# Patient Record
(demographics unavailable — no encounter records)

---

## 2025-01-30 NOTE — ASSESSMENT
[FreeTextEntry1] : 43yo F with T2DM seen for CPE  CPE today Labs today  just had the flu - will hold off sonogram done - benign lesion in L breast - repeat in May 2025 due for ophtho sees dental - Dr. Hampton 0([place]) has derm - due for visit sees GYN -Dr. Guerra  T2DM - moderately controlled-    cont glipizide, stillatro

## 2025-01-30 NOTE — HISTORY OF PRESENT ILLNESS
[FreeTextEntry1] : CPE [de-identified] : 342yo F  R. shoulder pain - onset while wearing a back back 1.5yrs ongoing -- notices the pain when she has to do her cleaning the pain goes into her chest area; pains lasts for seconds had a shoulder x-ray done but MRI was not approved...  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 Moreno of ENT; getitng dental implants next month

## 2025-01-30 NOTE — PHYSICAL EXAM
[de-identified] : Gen: Adult F, NAD Head: NC/AT EENT: ears grossly normal, PERRL, EOMI, moist mucosa Neck: supple Chest wall: nontender CV: normal s1 +s2, rrr, no murmurs Pulm: CTA-B Abd: soft, NT, ND Skin: no rashes Back: no CVA tenderness, no spinal tenderness Neuro: gait normal, AAOx3 Psych: normal affect, normal interaction

## 2025-02-06 NOTE — PHYSICAL EXAM
[de-identified] : Right shoulder exam  Inspection: No malalignment, No defects, No atrophy Skin: No masses, No lesions Neck: Negative Spurling's, full ROM, no pain with ROM AROM: FF to 180, abduction to 80, ER to 60, IR to low lumbar Painful arc ROM: IR Tenderness: +bicipital tenderness, no tenderness to the greater tuberosity/RTC insertion, no anterior shoulder/lesser tuberosity tenderness Strength: 5/5 ER, 5/5 IR in adduction with pain, 5/5 supraspinatus testing with pain, negative Musselshell's test AC Joint: No ttp/pain with cross arm testing Biceps: Speed Negative, Yergusons Negative Impingement test: +Do, + Neer  Stability: Stable Vasc: 2+ radial pulse Neuro: AIN, PIN, Ulnar nerve intact to motor Sensation: Intact to light touch throughout  [de-identified] : 3 views of the right shoulder were obtained, 06/11/2024, that show no acute fracture or dislocation. There is no glenohumeral and no AC joint degenerative change seen. Type II acromion. There is no significant malalignment. Calcifications to the biceps tendon.

## 2025-02-06 NOTE — HISTORY OF PRESENT ILLNESS
[de-identified] : 42-year-old right-hand dominant female presents with right shoulder pain ongoing since 2023.  She reports the pain worsened over the past summer, prompting a visit to urgent care. The pain is exacerbated by internal rotation, forward flexion (FF), and lifting.  The pain is also worse at night and laying on the affected size. The pain also radiates into the chest at time. Patient reports the pain is limiting her ability to perform ADLs. Heat relieves the pain. She denies taking pain medication or any history of shoulder surgery.  The patient's past medical history, past surgical history, medications and allergies were reviewed by me today with the patient and documented accordingly. In addition, the patient's family and social history, which were noncontributory to this visit were reviewed also.

## 2025-02-06 NOTE — ADDENDUM
[FreeTextEntry1] : This note was written by Gena Davalos on 02/06/2025 acting solely as a scribe for Dr. Arthur Muñoz.   All medical record entries made by the Scribe were at my, Dr. Arthur Muñoz, direction and personally dictated by me on 02/06/2025. I have personally reviewed the chart and agree that the record accurately reflects my personal performance of the history, physical exam, assessment and plan.

## 2025-02-06 NOTE — DISCUSSION/SUMMARY
[de-identified] : 41 y/o female with right shoulder pain.  Patient presents with chronic right shoulder pain. A discussion was had with the patient regarding potential sources of inflammatory shoulder discomfort, including rotator cuff tendon dysfunction (including tendonitis vs. internal structural damage), subdeltoid/subacromial bursitis, or impingement of the rotator cuff at the acromion. Other contributing sources of pain may be the acromioclavicular joint or long head of the biceps tendon. Irritation is typically caused either by overhead repetitive activity or as a result of minor injury.   Discussed short-term and long-term outcomes as well as the goal of treatment to reduce pain and restore function. Nonsurgical treatment is typically first-line therapy that may take weeks to months to resolve symptoms; includes rest from overhead activities, NSAIDs, home exercise program versus physical therapy to restore normal strength/ROM/function of the shoulder, and possible corticosteroid injection. Also discussed the role of arthroscopic surgical intervention when nonsurgical treatment does not adequately relieve pain/inflammation.   MRI Rx given to patient to further delineate any internal structural derangement to the shoulder. This will allow for better understanding of the severity of disease and allow for better stratification of treatment strategies (surgical vs. non-surgical). Patient is agreeable to further imaging.  We discussed that she likely will require formal PT, and prescription was provided today.   Follow-up: After MRI

## 2025-07-08 NOTE — REASON FOR VISIT
[Home] : at home, [unfilled] , at the time of the visit. [Other Location: e.g. Home (Enter Location, City,State)___] : at [unfilled] [Telehealth (audio & video)] : This visit was provided via telehealth using real-time 2-way audio visual technology. [Verbal consent obtained from patient] : the patient, [unfilled] [Initial Consultation] : an initial consultation for [FreeTextEntry2] : leukocytosis

## 2025-07-11 NOTE — REVIEW OF SYSTEMS
[SOB on Exertion] : shortness of breath on exertion [Abdominal Pain] : abdominal pain [Nipple Discharge] : nipple discharge [Skin Lesion on Breast] : skin lesion on breast [Myalgias] : myalgias [Joint Stiffness] : joint stiffness [Back Pain] : back pain [Negative] : Heme/Lymph

## 2025-07-11 NOTE — PHYSICAL EXAM
[Chaperoned Physical Exam] : A chaperone was present in the examining room during all aspects of the physical examination. [MA] : MA [FreeTextEntry2] : Ketty [Appropriately responsive] : appropriately responsive [Alert] : alert [No Acute Distress] : no acute distress [No Lymphadenopathy] : no lymphadenopathy [Soft] : soft [Non-tender] : non-tender [Non-distended] : non-distended [No HSM] : No HSM [No Lesions] : no lesions [No Mass] : no mass [Oriented x3] : oriented x3 [Examination Of The Breasts] : a normal appearance [No Masses] : no breast masses were palpable [Labia Majora] : normal [Labia Minora] : normal [Normal] : normal [Uterine Adnexae] : normal [Declined] : Patient declined rectal exam
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
